# Patient Record
Sex: MALE | URBAN - METROPOLITAN AREA
[De-identification: names, ages, dates, MRNs, and addresses within clinical notes are randomized per-mention and may not be internally consistent; named-entity substitution may affect disease eponyms.]

---

## 2018-11-26 ENCOUNTER — EMERGENCY (EMERGENCY)
Facility: HOSPITAL | Age: 43
LOS: 1 days | Discharge: HOME | End: 2018-11-26
Attending: EMERGENCY MEDICINE
Payer: COMMERCIAL

## 2018-11-26 VITALS — WEIGHT: 160.06 LBS

## 2018-11-26 VITALS
SYSTOLIC BLOOD PRESSURE: 147 MMHG | DIASTOLIC BLOOD PRESSURE: 84 MMHG | OXYGEN SATURATION: 100 % | HEART RATE: 62 BPM | RESPIRATION RATE: 20 BRPM | TEMPERATURE: 98 F

## 2018-11-26 LAB
APPEARANCE UR: CLEAR — SIGNIFICANT CHANGE UP
BILIRUB UR-MCNC: NEGATIVE — SIGNIFICANT CHANGE UP
COLOR SPEC: YELLOW — SIGNIFICANT CHANGE UP
DIFF PNL FLD: NEGATIVE — SIGNIFICANT CHANGE UP
GLUCOSE UR QL: NEGATIVE — SIGNIFICANT CHANGE UP
KETONES UR-MCNC: NEGATIVE — SIGNIFICANT CHANGE UP
LEUKOCYTE ESTERASE UR-ACNC: NEGATIVE — SIGNIFICANT CHANGE UP
NITRITE UR-MCNC: NEGATIVE — SIGNIFICANT CHANGE UP
PH UR: 6 — SIGNIFICANT CHANGE UP (ref 5–8)
PROT UR-MCNC: NEGATIVE — SIGNIFICANT CHANGE UP
SP GR SPEC: 1.02 — SIGNIFICANT CHANGE UP (ref 1.01–1.02)
UROBILINOGEN FLD QL: NEGATIVE — SIGNIFICANT CHANGE UP

## 2018-11-26 PROCEDURE — 76870 US EXAM SCROTUM: CPT | Mod: 26

## 2018-11-26 PROCEDURE — 76870 US EXAM SCROTUM: CPT

## 2018-11-26 PROCEDURE — 87491 CHLMYD TRACH DNA AMP PROBE: CPT

## 2018-11-26 PROCEDURE — 99284 EMERGENCY DEPT VISIT MOD MDM: CPT | Mod: 25

## 2018-11-26 PROCEDURE — 87591 N.GONORRHOEAE DNA AMP PROB: CPT

## 2018-11-26 PROCEDURE — 99284 EMERGENCY DEPT VISIT MOD MDM: CPT

## 2018-11-26 PROCEDURE — 87086 URINE CULTURE/COLONY COUNT: CPT

## 2018-11-26 PROCEDURE — 96372 THER/PROPH/DIAG INJ SC/IM: CPT

## 2018-11-26 PROCEDURE — 81003 URINALYSIS AUTO W/O SCOPE: CPT

## 2018-11-26 RX ORDER — CEFTRIAXONE 500 MG/1
250 INJECTION, POWDER, FOR SOLUTION INTRAMUSCULAR; INTRAVENOUS ONCE
Qty: 0 | Refills: 0 | Status: COMPLETED | OUTPATIENT
Start: 2018-11-26 | End: 2018-11-26

## 2018-11-26 RX ADMIN — CEFTRIAXONE 250 MILLIGRAM(S): 500 INJECTION, POWDER, FOR SOLUTION INTRAMUSCULAR; INTRAVENOUS at 20:00

## 2018-11-26 NOTE — ED POST DISCHARGE NOTE - ADDITIONAL DOCUMENTATION
Pt given ceftriaxone only. Left VM that would like to discuss another abx, and would send it to pharmacy, sent doxycycline to the pharmacy

## 2018-11-26 NOTE — ED PROVIDER NOTE - CONDUCTED A DETAILED DISCUSSION WITH PATIENT AND/OR GUARDIAN REGARDING, MDM
return to ED if symptoms worsen, persist or questions arise/radiology results need for outpatient follow-up/return to ED if symptoms worsen, persist or questions arise/radiology results

## 2018-11-26 NOTE — ED PROVIDER NOTE - MEDICAL DECISION MAKING DETAILS
Character with burning as well as increased jogging more consistent with dermatitis. Will obtain US, per prior MDs request as well as urine. Anticipate d/c with urology f/u. Well appearing, hemodynamically stable, no acute pain. Character with burning as well as increased jogging more consistent with dermatitis. Will obtain US, per prior MDs request as well as urine. Anticipate d/c with urology f/u. Well appearing, hemodynamically stable, no acute pain, TWIST score 0.

## 2018-11-26 NOTE — ED ADULT NURSE NOTE - OBJECTIVE STATEMENT
AOX3 +ambulatory patient reports L testicular pain x 1 week. Patient denies any trauma no penile discharge

## 2018-11-26 NOTE — ED STATDOCS - OBJECTIVE STATEMENT
Telemedicine assessment was conducted (using real time 2 way audio-video technology) by Dr. Lima Wilson located at [address] [place] [id number]  +++++++++++++++++++++++++++++++++++++++++++++++++++  History and Plan: 44 y/o M with no significant PMHx or PSHx presents to the ED with complaints of intermittent bilateral testicular pain x 2 weeks; patient describes pain as burning sensation. Patient was seen at urgent care today at which time was advised to present to the ED for ultrasound to rule out testicular torsion. Patient had urinalysis at that time which was unremarkable. Patient denies penile discharge, history of STIs or any other complaints. NKDA.   Plan: Will obtain urinalysis and scrotal ultrasound. Patient declines pain medications at this time. Telemedicine assessment was conducted (using real time 2 way audio-video technology) by Dr. Lima Wilson located at [address] [place] [id number]  +++++++++++++++++++++++++++++++++++++++++++++++++++  History and Plan: 44 y/o M with no significant PMHx or PSHx presents to the ED with complaints of intermittent bilateral testicular pain x 2 weeks; patient describes pain as burning sensation. Patient was seen at urgent care today at which time was advised to present to the ED for ultrasound to rule out testicular torsion. Patient had urinalysis at that time which was unremarkable. Patient denies penile discharge, history of STIs or any other complaints. Sexually active with wife only. NKDA.      exam deferred but on virtual exam pt is well appearing.  Plan: Will obtain urinalysis, urine GC/chlam and scrotal ultrasound. Patient declines pain medications at this time.

## 2018-11-26 NOTE — ED PROVIDER NOTE - OBJECTIVE STATEMENT
42 y/o M pt with no significant PMHx and no significant PSHx sent to the ED from urgent care c/o intermittent burning sensation to b/l testiculars x2 weeks. Pt reports his symptoms started off as feeling uncomfortable while sitting. Pt states he currently feels burning to his testicles as well as his buttocks while sitting; everything "just feels hot" as per pt. Pt relates he did start jogging more recently and is wondering if that could attribute to his symptoms. Pt denies abd pain, penile d/c, fever, chills, trouble urinating or any other complaints. NKDA.

## 2018-11-26 NOTE — ED PROVIDER NOTE - PHYSICAL EXAMINATION
Afebrile, hemodynamically stable  NAD, well appearing  Head NCAT  EOMI grossly, anicteric  MMM  Breathing comfortably on room air.  : Chaperone: Jim Chung. Normal testicular lie. Normal cremasteric reflex. No tenderness to palpation. No erythema to the area noted. Diffuse patchiness in the b/l groin area consistent with localized dermatitis.   AAO, CN's 3-12 grossly intact  HARDY spontaneously, no leg cyanosis or edema  Skin warm, well perfused, no rashes or hives

## 2018-11-26 NOTE — ED PROVIDER NOTE - CARE PLAN
Principal Discharge DX:	Orchiditis Principal Discharge DX:	Orchiditis  Secondary Diagnosis:	Jock itch

## 2018-11-27 LAB
C TRACH RRNA SPEC QL NAA+PROBE: SIGNIFICANT CHANGE UP
CULTURE RESULTS: NO GROWTH — SIGNIFICANT CHANGE UP
N GONORRHOEA RRNA SPEC QL NAA+PROBE: SIGNIFICANT CHANGE UP
SPECIMEN SOURCE: SIGNIFICANT CHANGE UP
SPECIMEN SOURCE: SIGNIFICANT CHANGE UP

## 2018-11-28 NOTE — CHART NOTE - NSCHARTNOTEFT_GEN_A_CORE
Pt called me back, picked up abx and feeling better with those. Reports some discomfort at shaft. Told to return to ED if worsening symptoms or fever, and close urology f/u.
